# Patient Record
Sex: MALE | Race: BLACK OR AFRICAN AMERICAN | Employment: FULL TIME | URBAN - METROPOLITAN AREA
[De-identification: names, ages, dates, MRNs, and addresses within clinical notes are randomized per-mention and may not be internally consistent; named-entity substitution may affect disease eponyms.]

---

## 2017-02-28 ENCOUNTER — HOSPITAL ENCOUNTER (EMERGENCY)
Age: 22
Discharge: HOME | End: 2017-03-01
Attending: EMERGENCY MEDICINE
Payer: OTHER GOVERNMENT

## 2017-02-28 DIAGNOSIS — S99.911A RIGHT ANKLE INJURY, INITIAL ENCOUNTER: ICD-10-CM

## 2017-02-28 DIAGNOSIS — S30.0XXA CONTUSION OF PELVIS, INITIAL ENCOUNTER: ICD-10-CM
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
## 2017-02-28 PROCEDURE — 74011250637 HC RX REV CODE- 250/637: Performed by: EMERGENCY MEDICINE

## 2017-02-28 PROCEDURE — 99285 EMERGENCY DEPT VISIT HI MDM: CPT

## 2017-02-28 RX ORDER — HYDROCODONE BITARTRATE AND ACETAMINOPHEN 7.5; 325 MG/1; MG/1
1 TABLET ORAL
Status: COMPLETED | OUTPATIENT
Start: 2017-02-28 | End: 2017-02-28

## 2017-02-28 RX ORDER — IBUPROFEN 600 MG/1
600 TABLET ORAL
Status: COMPLETED | OUTPATIENT
Start: 2017-02-28 | End: 2017-02-28

## 2017-02-28 RX ADMIN — IBUPROFEN 600 MG: 600 TABLET ORAL at 23:56

## 2017-02-28 RX ADMIN — HYDROCODONE BITARTRATE AND ACETAMINOPHEN 1 TABLET: 7.5; 325 TABLET ORAL at 23:56

## 2017-02-28 NOTE — LETTER
NOTIFICATION RETURN TO WORK / SCHOOL 
 
3/1/2017 1:13 AM 
 
Mr. Nancy Pantoja [facility] [phone] To Whom It May Concern: 
 
Nancy Hauser is currently under the care of [facility] EMERGENCY DEPT. He will return to work/school on: 3/2/17 but will need clearance from medical regarding further duty disposition If there are questions or concerns please have the patient contact our office. Sincerely, Manjit Billingsley MD

## 2017-03-01 ENCOUNTER — APPOINTMENT (OUTPATIENT)
Dept: GENERAL RADIOLOGY | Age: 22
End: 2017-03-01
Attending: EMERGENCY MEDICINE
Payer: OTHER GOVERNMENT

## 2017-03-01 VITALS
DIASTOLIC BLOOD PRESSURE: 74 MMHG | RESPIRATION RATE: 13 BRPM | SYSTOLIC BLOOD PRESSURE: 142 MMHG | BODY MASS INDEX: 28.72 KG/M2 | OXYGEN SATURATION: 99 % | TEMPERATURE: 99 F | HEIGHT: 75 IN | HEART RATE: 111 BPM | WEIGHT: 231 LBS

## 2017-03-01 PROCEDURE — 72170 X-RAY EXAM OF PELVIS: CPT

## 2017-03-01 PROCEDURE — 73610 X-RAY EXAM OF ANKLE: CPT

## 2017-03-01 PROCEDURE — 77030036558 HC IMMOB ANK AIR STRRP S2SG-A

## 2017-03-01 RX ORDER — IBUPROFEN 800 MG/1
800 TABLET ORAL
Qty: 21 TAB | Refills: 0 | Status: SHIPPED | OUTPATIENT
Start: 2017-03-01 | End: 2017-03-08

## 2017-03-01 RX ORDER — HYDROCODONE BITARTRATE AND ACETAMINOPHEN 5; 325 MG/1; MG/1
1 TABLET ORAL
Qty: 6 TAB | Refills: 0 | Status: SHIPPED | OUTPATIENT
Start: 2017-03-01
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
## 2017-03-01 NOTE — ED NOTES
Patient discharged home, A&Ox4. Discharge instructions, medications and follow up reviewed with patient. Patient verbalizes understanding.